# Patient Record
Sex: MALE | Race: WHITE | NOT HISPANIC OR LATINO | URBAN - METROPOLITAN AREA
[De-identification: names, ages, dates, MRNs, and addresses within clinical notes are randomized per-mention and may not be internally consistent; named-entity substitution may affect disease eponyms.]

---

## 2020-09-14 ENCOUNTER — OFFICE VISIT (OUTPATIENT)
Dept: URBAN - METROPOLITAN AREA SURGERY CENTER 18 | Facility: SURGERY CENTER | Age: 75
End: 2020-09-14
Payer: MEDICARE

## 2020-09-14 ENCOUNTER — CLAIMS CREATED FROM THE CLAIM WINDOW (OUTPATIENT)
Dept: URBAN - METROPOLITAN AREA CLINIC 4 | Facility: CLINIC | Age: 75
End: 2020-09-14
Payer: MEDICARE

## 2020-09-14 DIAGNOSIS — D12.3 ADENOMA OF TRANSVERSE COLON: ICD-10-CM

## 2020-09-14 DIAGNOSIS — Z87.19 H/O DIVERTICULITIS OF COLON: ICD-10-CM

## 2020-09-14 DIAGNOSIS — D12.3 BENIGN NEOPLASM OF TRANSVERSE COLON: ICD-10-CM

## 2020-09-14 DIAGNOSIS — Z86.010 H/O ADENOMATOUS POLYP OF COLON: ICD-10-CM

## 2020-09-14 DIAGNOSIS — K31.9 DISEASE OF STOMACH AND DUODENUM, UNSPECIFIED: ICD-10-CM

## 2020-09-14 DIAGNOSIS — K31.89 ACQUIRED DEFORMITY OF PYLORUS: ICD-10-CM

## 2020-09-14 PROCEDURE — 43239 EGD BIOPSY SINGLE/MULTIPLE: CPT | Performed by: INTERNAL MEDICINE

## 2020-09-14 PROCEDURE — 88305 TISSUE EXAM BY PATHOLOGIST: CPT | Performed by: PATHOLOGY

## 2020-09-14 PROCEDURE — G9936 PMH PLYP/NEO CO/RECT/JUN/ANS: HCPCS | Performed by: INTERNAL MEDICINE

## 2020-09-14 PROCEDURE — G8907 PT DOC NO EVENTS ON DISCHARG: HCPCS | Performed by: INTERNAL MEDICINE

## 2020-09-14 PROCEDURE — 45380 COLONOSCOPY AND BIOPSY: CPT | Performed by: INTERNAL MEDICINE

## 2021-01-04 ENCOUNTER — TELEPHONE ENCOUNTER (OUTPATIENT)
Dept: URBAN - METROPOLITAN AREA CLINIC 96 | Facility: CLINIC | Age: 76
End: 2021-01-04

## 2021-01-04 RX ORDER — FAMOTIDINE 20 MG/1
TAKE 1 TABLET (20 MG) BY ORAL ROUTE ONCE DAILY AT BEDTIME FOR 90 DAYS TABLET ORAL 1
Qty: 90 | Refills: 0 | OUTPATIENT

## 2021-02-05 ENCOUNTER — OFFICE VISIT (OUTPATIENT)
Dept: URBAN - METROPOLITAN AREA TELEHEALTH 2 | Facility: TELEHEALTH | Age: 76
End: 2021-02-05
Payer: MEDICARE

## 2021-02-05 DIAGNOSIS — K22.70 BARRETT'S ESOPHAGUS WITHOUT DYSPLASIA: ICD-10-CM

## 2021-02-05 DIAGNOSIS — D12.3 BENIGN NEOPLASM OF TRANSVERSE COLON: ICD-10-CM

## 2021-02-05 DIAGNOSIS — K31.9 DISEASE OF STOMACH AND DUODENUM, UNSPECIFIED: ICD-10-CM

## 2021-02-05 PROCEDURE — 3017F COLORECTAL CA SCREEN DOC REV: CPT | Performed by: PHYSICIAN ASSISTANT

## 2021-02-05 PROCEDURE — G9906 PT RECV TBCO CESS INTERV: HCPCS | Performed by: PHYSICIAN ASSISTANT

## 2021-02-05 PROCEDURE — G8427 DOCREV CUR MEDS BY ELIG CLIN: HCPCS | Performed by: PHYSICIAN ASSISTANT

## 2021-02-05 PROCEDURE — 4004F PT TOBACCO SCREEN RCVD TLK: CPT | Performed by: PHYSICIAN ASSISTANT

## 2021-02-05 PROCEDURE — G8420 CALC BMI NORM PARAMETERS: HCPCS | Performed by: PHYSICIAN ASSISTANT

## 2021-02-05 PROCEDURE — 99214 OFFICE O/P EST MOD 30 MIN: CPT | Performed by: PHYSICIAN ASSISTANT

## 2021-02-05 PROCEDURE — G8482 FLU IMMUNIZE ORDER/ADMIN: HCPCS | Performed by: PHYSICIAN ASSISTANT

## 2021-02-05 PROCEDURE — G9902 PT SCRN TBCO AND ID AS USER: HCPCS | Performed by: PHYSICIAN ASSISTANT

## 2021-02-05 RX ORDER — FAMOTIDINE 20 MG/1
TAKE 1 TABLET (20 MG) BY ORAL ROUTE ONCE DAILY AT BEDTIME FOR 90 DAYS TABLET ORAL 1
Qty: 90 | Refills: 0 | Status: ACTIVE | COMMUNITY

## 2021-02-05 RX ORDER — FAMOTIDINE 20 MG/1
TAKE 1 TABLET (20 MG) BY ORAL ROUTE ONCE DAILY AT BEDTIME FOR 90 DAYS TABLET ORAL 1
Qty: 90 | Refills: 3

## 2021-02-05 RX ORDER — CHLORTHALIDONE 25 MG/1
1 TABLET IN THE MORNING WITH FOOD TABLET ORAL ONCE A DAY
Status: ACTIVE | COMMUNITY

## 2021-02-05 RX ORDER — ESOMEPRAZOLE MAGNESIUM 40 MG/1
TAKE 1 CAPSULE (40 MG) BY ORAL ROUTE ONCE DAILY FOR 90 DAYS CAPSULE, DELAYED RELEASE PELLETS ORAL 1
Qty: 90 | Refills: 4 | Status: ACTIVE | COMMUNITY
Start: 2020-04-01 | End: 2021-06-25

## 2021-02-05 RX ORDER — ESOMEPRAZOLE MAGNESIUM 40 MG/1
TAKE 1 CAPSULE (40 MG) BY ORAL ROUTE ONCE DAILY FOR 90 DAYS CAPSULE, DELAYED RELEASE PELLETS ORAL 1
Qty: 90 | Refills: 3
Start: 2020-04-01

## 2021-02-05 NOTE — HPI-TODAY'S VISIT:
Patient is on Famotidine 1 po qd Also on Esomeprazole 40mg a day He thinks he is dong well, no stomach acid or break through symptoms at night like he used to He will occasionally after eating in the evening have the feeling of needing to clear his throat - with certain foods like chinese foods - he will take a tums and it helps No abd pain No nausea or emesis BM are wnl, no BRBPR or melena Last colon and egd 9/2020 -- tubular adenoma and no signs of Rodríguez's esophagus - has a hx of Rodríguez's esophagus

## 2021-03-18 ENCOUNTER — WEB ENCOUNTER (OUTPATIENT)
Dept: URBAN - METROPOLITAN AREA CLINIC 96 | Facility: CLINIC | Age: 76
End: 2021-03-18

## 2021-12-17 ENCOUNTER — ERX REFILL RESPONSE (OUTPATIENT)
Dept: URBAN - METROPOLITAN AREA CLINIC 80 | Facility: CLINIC | Age: 76
End: 2021-12-17

## 2021-12-17 RX ORDER — FAMOTIDINE 20 MG/1
TAKE 1 TABLET (20 MG) BY ORAL ROUTE ONCE DAILY AT BEDTIME FOR 90 DAYS TABLET ORAL 1
Qty: 90 | Refills: 3 | OUTPATIENT

## 2021-12-17 RX ORDER — FAMOTIDINE 20 MG/1
TAKE 1 TABLET AT BEDTIME TABLET, FILM COATED ORAL
Qty: 90 TABLET | Refills: 1 | OUTPATIENT

## 2022-02-21 ENCOUNTER — ERX REFILL RESPONSE (OUTPATIENT)
Dept: URBAN - METROPOLITAN AREA CLINIC 80 | Facility: CLINIC | Age: 77
End: 2022-02-21

## 2022-02-21 RX ORDER — FAMOTIDINE 20 MG/1
TAKE 1 TABLET AT BEDTIME TABLET, FILM COATED ORAL
Qty: 90 TABLET | Refills: 1 | OUTPATIENT

## 2022-04-07 ENCOUNTER — ERX REFILL RESPONSE (OUTPATIENT)
Dept: URBAN - METROPOLITAN AREA CLINIC 80 | Facility: CLINIC | Age: 77
End: 2022-04-07

## 2022-04-07 RX ORDER — FAMOTIDINE 20 MG/1
TAKE 1 TABLET AT BEDTIME TABLET, FILM COATED ORAL
Qty: 90 TABLET | Refills: 1 | OUTPATIENT

## 2022-04-07 RX ORDER — FAMOTIDINE 20 MG/1
TAKE 1 TABLET AT BEDTIME (NEED APPOINTMENT FOR FURTHER REFILLS) TABLET, FILM COATED ORAL
Qty: 90 TABLET | Refills: 1 | OUTPATIENT

## 2022-04-11 ENCOUNTER — WEB ENCOUNTER (OUTPATIENT)
Dept: URBAN - METROPOLITAN AREA CLINIC 96 | Facility: CLINIC | Age: 77
End: 2022-04-11

## 2022-05-04 ENCOUNTER — WEB ENCOUNTER (OUTPATIENT)
Dept: URBAN - METROPOLITAN AREA TELEHEALTH 2 | Facility: TELEHEALTH | Age: 77
End: 2022-05-04

## 2022-05-06 ENCOUNTER — OFFICE VISIT (OUTPATIENT)
Dept: URBAN - METROPOLITAN AREA TELEHEALTH 2 | Facility: TELEHEALTH | Age: 77
End: 2022-05-06
Payer: MEDICARE

## 2022-05-06 DIAGNOSIS — D12.3 BENIGN NEOPLASM OF TRANSVERSE COLON: ICD-10-CM

## 2022-05-06 DIAGNOSIS — K31.9 DISEASE OF STOMACH AND DUODENUM, UNSPECIFIED: ICD-10-CM

## 2022-05-06 DIAGNOSIS — K22.70 BARRETT'S ESOPHAGUS WITHOUT DYSPLASIA: ICD-10-CM

## 2022-05-06 PROBLEM — 302914006: Status: ACTIVE | Noted: 2021-02-05

## 2022-05-06 PROCEDURE — 99213 OFFICE O/P EST LOW 20 MIN: CPT | Performed by: PHYSICIAN ASSISTANT

## 2022-05-06 RX ORDER — FAMOTIDINE 20 MG/1
1 TABLET AT BEDTIME AS NEEDED TABLET, FILM COATED ORAL ONCE A DAY
Qty: 90 | Refills: 3 | OUTPATIENT
Start: 2022-05-06

## 2022-05-06 RX ORDER — ESOMEPRAZOLE MAGNESIUM 40 MG/1
TAKE 1 CAPSULE (40 MG) BY ORAL ROUTE ONCE DAILY FOR 90 DAYS CAPSULE, DELAYED RELEASE PELLETS ORAL 1
Qty: 90 | Refills: 3 | Status: ACTIVE | COMMUNITY
Start: 2020-04-01

## 2022-05-06 RX ORDER — FAMOTIDINE 20 MG/1
TAKE 1 TABLET AT BEDTIME (NEED APPOINTMENT FOR FURTHER REFILLS) TABLET, FILM COATED ORAL
Qty: 90 TABLET | Refills: 1 | Status: ACTIVE | COMMUNITY

## 2022-05-06 RX ORDER — ESOMEPRAZOLE MAGNESIUM 40 MG/1
1 CAPSULE CAPSULE, DELAYED RELEASE ORAL ONCE A DAY
Qty: 90 CAPSULE | Refills: 3 | OUTPATIENT
Start: 2022-05-06

## 2022-05-06 RX ORDER — CHLORTHALIDONE 25 MG/1
1 TABLET IN THE MORNING WITH FOOD TABLET ORAL ONCE A DAY
Status: ACTIVE | COMMUNITY

## 2022-05-06 NOTE — HPI-TODAY'S VISIT:
Pt is feeling good He is taking the Esomepraozle 40mg a day, Famotidine 20mg at bedtime He has rare break through symptoms - 1 in the last 6 months In 2016 EGD showed Rodríguez's, EGD in 2017 and 2020 the path was negative for Rodríguez's BM are good - no issues No nausea or emesis No fevers or chills

## 2022-08-15 ENCOUNTER — ERX REFILL RESPONSE (OUTPATIENT)
Dept: URBAN - METROPOLITAN AREA CLINIC 80 | Facility: CLINIC | Age: 77
End: 2022-08-15

## 2022-08-15 RX ORDER — FAMOTIDINE 20 MG/1
TAKE 1 TABLET AT BEDTIME (NEED APPOINTMENT FOR FURTHER REFILLS) TABLET, FILM COATED ORAL
Qty: 90 TABLET | Refills: 2 | OUTPATIENT

## 2022-08-15 RX ORDER — FAMOTIDINE 20 MG/1
TAKE 1 TABLET AT BEDTIME (NEED APPOINTMENT FOR FURTHER REFILLS) TABLET, FILM COATED ORAL
Qty: 90 TABLET | Refills: 1 | OUTPATIENT

## 2023-05-30 ENCOUNTER — WEB ENCOUNTER (OUTPATIENT)
Dept: URBAN - METROPOLITAN AREA CLINIC 126 | Facility: CLINIC | Age: 78
End: 2023-05-30

## 2023-06-02 ENCOUNTER — OFFICE VISIT (OUTPATIENT)
Dept: URBAN - METROPOLITAN AREA CLINIC 126 | Facility: CLINIC | Age: 78
End: 2023-06-02

## 2023-06-22 ENCOUNTER — LAB OUTSIDE AN ENCOUNTER (OUTPATIENT)
Dept: URBAN - METROPOLITAN AREA CLINIC 50 | Facility: CLINIC | Age: 78
End: 2023-06-22

## 2023-06-22 ENCOUNTER — OFFICE VISIT (OUTPATIENT)
Dept: URBAN - METROPOLITAN AREA CLINIC 50 | Facility: CLINIC | Age: 78
End: 2023-06-22
Payer: MEDICARE

## 2023-06-22 VITALS
SYSTOLIC BLOOD PRESSURE: 133 MMHG | DIASTOLIC BLOOD PRESSURE: 75 MMHG | HEIGHT: 75 IN | BODY MASS INDEX: 28.47 KG/M2 | TEMPERATURE: 98.5 F | WEIGHT: 229 LBS

## 2023-06-22 DIAGNOSIS — D12.6 TUBULAR ADENOMA OF COLON: ICD-10-CM

## 2023-06-22 DIAGNOSIS — Z86.010 PERSONAL HISTORY OF COLONIC POLYPS: ICD-10-CM

## 2023-06-22 DIAGNOSIS — K22.70 BARRETT'S ESOPHAGUS WITHOUT DYSPLASIA: ICD-10-CM

## 2023-06-22 PROBLEM — 428283002: Status: ACTIVE | Noted: 2023-06-22

## 2023-06-22 PROCEDURE — 99213 OFFICE O/P EST LOW 20 MIN: CPT | Performed by: INTERNAL MEDICINE

## 2023-06-22 RX ORDER — ESOMEPRAZOLE MAGNESIUM 40 MG/1
1 CAPSULE CAPSULE, DELAYED RELEASE ORAL ONCE A DAY
Qty: 90 CAPSULE | Refills: 3 | OUTPATIENT
Start: 2023-06-22

## 2023-06-22 RX ORDER — FAMOTIDINE 20 MG/1
1 TABLET AT BEDTIME AS NEEDED TABLET, FILM COATED ORAL ONCE A DAY
Qty: 90 TABLET | Refills: 3 | OUTPATIENT
Start: 2023-06-22

## 2023-06-22 RX ORDER — SODIUM, POTASSIUM,MAG SULFATES 17.5-3.13G
177 ML SOLUTION, RECONSTITUTED, ORAL ORAL AS DIRECTED
Qty: 1 BOX | Refills: 0 | OUTPATIENT
Start: 2023-06-22 | End: 2023-06-23

## 2023-06-22 NOTE — HPI-TODAY'S VISIT:
78 y.o. WM, retired , , 2 children, 4 grandchildren  PCP - Oswald Eubanks Seen previously by Dr. Gray and Dr. Marshall Due for annual visit and scopes Current regimen working for his reflux - few months ago, occ phlegm after certain foods w/o burning - controls w/ Tums - once / 10 days No dysphagia No unitentional wt loss No burning at night Bowels are good UTD w/ physicals - last friday  No issues w/ stairs

## 2023-08-10 ENCOUNTER — TELEPHONE ENCOUNTER (OUTPATIENT)
Dept: URBAN - METROPOLITAN AREA CLINIC 92 | Facility: CLINIC | Age: 78
End: 2023-08-10

## 2023-09-15 ENCOUNTER — OFFICE VISIT (OUTPATIENT)
Dept: URBAN - METROPOLITAN AREA MEDICAL CENTER 28 | Facility: MEDICAL CENTER | Age: 78
End: 2023-09-15

## 2023-10-11 ENCOUNTER — ERX REFILL RESPONSE (OUTPATIENT)
Dept: URBAN - METROPOLITAN AREA CLINIC 80 | Facility: CLINIC | Age: 78
End: 2023-10-11

## 2023-10-11 RX ORDER — FAMOTIDINE 20 MG/1
TAKE 1 TABLET AT BEDTIME (NEED APPOINTMENT FOR FURTHER REFILLS) TABLET, FILM COATED ORAL
Qty: 90 TABLET | Refills: 11 | OUTPATIENT

## 2023-10-11 RX ORDER — FAMOTIDINE 20 MG/1
TAKE 1 TABLET AT BEDTIME (NEED APPOINTMENT FOR FURTHER REFILLS) TABLET, FILM COATED ORAL
Qty: 90 TABLET | Refills: 10 | OUTPATIENT

## 2023-11-07 ENCOUNTER — CLAIMS CREATED FROM THE CLAIM WINDOW (OUTPATIENT)
Dept: URBAN - METROPOLITAN AREA CLINIC 4 | Facility: CLINIC | Age: 78
End: 2023-11-07
Payer: MEDICARE

## 2023-11-07 ENCOUNTER — OFFICE VISIT (OUTPATIENT)
Dept: URBAN - METROPOLITAN AREA SURGERY CENTER 18 | Facility: SURGERY CENTER | Age: 78
End: 2023-11-07
Payer: MEDICARE

## 2023-11-07 DIAGNOSIS — Z86.010 ADENOMAS PERSONAL HISTORY OF COLONIC POLYPS: ICD-10-CM

## 2023-11-07 DIAGNOSIS — K31.7 BENIGN GASTRIC POLYP: ICD-10-CM

## 2023-11-07 DIAGNOSIS — Z12.11 COLON CANCER SCREENING (HIGH RISK): ICD-10-CM

## 2023-11-07 DIAGNOSIS — K20.80 ESOPHAGITIS, LOS ANGELES GRADE A: ICD-10-CM

## 2023-11-07 DIAGNOSIS — D12.0 ADENOMATOUS POLYP OF CECUM: ICD-10-CM

## 2023-11-07 DIAGNOSIS — D12.3 ADENOMA OF TRANSVERSE COLON: ICD-10-CM

## 2023-11-07 DIAGNOSIS — K63.5 BENIGN COLON POLYP: ICD-10-CM

## 2023-11-07 DIAGNOSIS — K21.9 GASTRO-ESOPHAGEAL REFLUX DISEASE WITHOUT ESOPHAGITIS: ICD-10-CM

## 2023-11-07 DIAGNOSIS — D12.0 ADENOMA OF CECUM: ICD-10-CM

## 2023-11-07 DIAGNOSIS — K44.9 HIATAL HERNIA: ICD-10-CM

## 2023-11-07 DIAGNOSIS — K21.9 ACID REFLUX: ICD-10-CM

## 2023-11-07 PROCEDURE — G8907 PT DOC NO EVENTS ON DISCHARG: HCPCS | Performed by: INTERNAL MEDICINE

## 2023-11-07 PROCEDURE — 43239 EGD BIOPSY SINGLE/MULTIPLE: CPT | Performed by: INTERNAL MEDICINE

## 2023-11-07 PROCEDURE — 45380 COLONOSCOPY AND BIOPSY: CPT | Performed by: INTERNAL MEDICINE

## 2023-11-07 PROCEDURE — 45385 COLONOSCOPY W/LESION REMOVAL: CPT | Performed by: INTERNAL MEDICINE

## 2023-11-07 PROCEDURE — 88312 SPECIAL STAINS GROUP 1: CPT | Performed by: PATHOLOGY

## 2023-11-07 PROCEDURE — 00813 ANES UPR LWR GI NDSC PX: CPT | Performed by: NURSE ANESTHETIST, CERTIFIED REGISTERED

## 2023-11-07 PROCEDURE — 88305 TISSUE EXAM BY PATHOLOGIST: CPT | Performed by: PATHOLOGY

## 2023-11-07 RX ORDER — ESOMEPRAZOLE MAGNESIUM 40 MG/1
1 CAPSULE CAPSULE, DELAYED RELEASE ORAL ONCE A DAY
Qty: 90 CAPSULE | Refills: 3 | Status: ACTIVE | COMMUNITY
Start: 2023-06-22

## 2023-11-07 RX ORDER — FAMOTIDINE 20 MG/1
TAKE 1 TABLET AT BEDTIME (NEED APPOINTMENT FOR FURTHER REFILLS) TABLET, FILM COATED ORAL
Qty: 90 TABLET | Refills: 10 | Status: ACTIVE | COMMUNITY

## 2023-11-09 ENCOUNTER — TELEPHONE ENCOUNTER (OUTPATIENT)
Dept: URBAN - METROPOLITAN AREA CLINIC 50 | Facility: CLINIC | Age: 78
End: 2023-11-09

## 2023-11-12 ENCOUNTER — DASHBOARD ENCOUNTERS (OUTPATIENT)
Age: 78
End: 2023-11-12

## 2023-11-16 ENCOUNTER — TELEPHONE ENCOUNTER (OUTPATIENT)
Dept: URBAN - METROPOLITAN AREA CLINIC 50 | Facility: CLINIC | Age: 78
End: 2023-11-16

## 2024-06-17 ENCOUNTER — P2P PATIENT RECORD (OUTPATIENT)
Age: 79
End: 2024-06-17

## 2024-07-22 ENCOUNTER — TELEPHONE ENCOUNTER (OUTPATIENT)
Dept: URBAN - METROPOLITAN AREA CLINIC 50 | Facility: CLINIC | Age: 79
End: 2024-07-22

## 2024-07-22 RX ORDER — ESOMEPRAZOLE MAGNESIUM 40 MG/1
1 CAPSULE CAPSULE, DELAYED RELEASE ORAL ONCE A DAY
Qty: 90 CAPSULE | Refills: 1
Start: 2023-06-22

## 2024-07-22 RX ORDER — FAMOTIDINE 20 MG/1
1 TABLET AT BEDTIME AS NEEDED TABLET, FILM COATED ORAL ONCE A DAY
Qty: 90 TABLET | Refills: 1

## 2024-12-05 ENCOUNTER — OFFICE VISIT (OUTPATIENT)
Dept: URBAN - METROPOLITAN AREA CLINIC 50 | Facility: CLINIC | Age: 79
End: 2024-12-05
Payer: MEDICARE

## 2024-12-05 VITALS
WEIGHT: 224 LBS | HEIGHT: 75 IN | DIASTOLIC BLOOD PRESSURE: 76 MMHG | HEART RATE: 80 BPM | TEMPERATURE: 98.1 F | BODY MASS INDEX: 27.85 KG/M2 | SYSTOLIC BLOOD PRESSURE: 123 MMHG

## 2024-12-05 DIAGNOSIS — D12.6 SERRATED ADENOMA OF COLON: ICD-10-CM

## 2024-12-05 DIAGNOSIS — D36.9 TUBULAR ADENOMA: ICD-10-CM

## 2024-12-05 DIAGNOSIS — K22.70 BARRETT'S ESOPHAGUS WITHOUT DYSPLASIA: ICD-10-CM

## 2024-12-05 DIAGNOSIS — K21.00 GASTROESOPHAGEAL REFLUX DISEASE WITH ESOPHAGITIS WITHOUT HEMORRHAGE: ICD-10-CM

## 2024-12-05 DIAGNOSIS — Z86.0100 PERSONAL HISTORY OF COLONIC POLYPS: ICD-10-CM

## 2024-12-05 PROBLEM — 266433003: Status: ACTIVE | Noted: 2024-12-05

## 2024-12-05 PROCEDURE — 99213 OFFICE O/P EST LOW 20 MIN: CPT | Performed by: INTERNAL MEDICINE

## 2024-12-05 RX ORDER — ESOMEPRAZOLE MAGNESIUM 40 MG/1
1 CAPSULE CAPSULE, DELAYED RELEASE ORAL ONCE A DAY
Qty: 90 CAPSULE | Refills: 3
Start: 2023-06-22

## 2024-12-05 RX ORDER — FAMOTIDINE 20 MG/1
1 TABLET AT BEDTIME AS NEEDED TABLET, FILM COATED ORAL ONCE A DAY
Qty: 90 TABLET | Refills: 3

## 2024-12-05 NOTE — HPI-TODAY'S VISIT:
79 y.o. WM Here for f/u Been doing well 3-4 x / yr will have reflux - usually after asian - controlled w/ tums - otherwise no complaints Using esomeprazole in am and famotidine in evening No difficulty swallowing Bowels regular Uses bran in AM religiously - key for him

## 2025-01-03 ENCOUNTER — P2P PATIENT RECORD (OUTPATIENT)
Age: 80
End: 2025-01-03

## 2025-05-30 ENCOUNTER — WEB ENCOUNTER (OUTPATIENT)
Dept: URBAN - METROPOLITAN AREA CLINIC 50 | Facility: CLINIC | Age: 80
End: 2025-05-30

## 2025-05-30 RX ORDER — ESOMEPRAZOLE MAGNESIUM 20 MG/1
1 CAPSULE CAPSULE, DELAYED RELEASE PELLETS ORAL ONCE A DAY
Qty: 90 CAPSULE | Refills: 1 | OUTPATIENT
Start: 2025-05-30